# Patient Record
Sex: MALE
[De-identification: names, ages, dates, MRNs, and addresses within clinical notes are randomized per-mention and may not be internally consistent; named-entity substitution may affect disease eponyms.]

---

## 2017-01-31 ENCOUNTER — APPOINTMENT (OUTPATIENT)
Dept: FAMILY MEDICINE | Facility: CLINIC | Age: 27
End: 2017-01-31

## 2017-01-31 ENCOUNTER — NON-APPOINTMENT (OUTPATIENT)
Age: 27
End: 2017-01-31

## 2017-01-31 VITALS
HEART RATE: 92 BPM | DIASTOLIC BLOOD PRESSURE: 100 MMHG | WEIGHT: 315 LBS | SYSTOLIC BLOOD PRESSURE: 150 MMHG | OXYGEN SATURATION: 94 % | BODY MASS INDEX: 45.1 KG/M2 | HEIGHT: 70 IN

## 2017-01-31 DIAGNOSIS — Z13.1 ENCOUNTER FOR SCREENING FOR DIABETES MELLITUS: ICD-10-CM

## 2017-01-31 DIAGNOSIS — J45.909 UNSPECIFIED ASTHMA, UNCOMPLICATED: ICD-10-CM

## 2017-01-31 DIAGNOSIS — Z13.21 ENCOUNTER FOR SCREENING FOR NUTRITIONAL DISORDER: ICD-10-CM

## 2017-01-31 DIAGNOSIS — Z13.220 ENCOUNTER FOR SCREENING FOR LIPOID DISORDERS: ICD-10-CM

## 2017-01-31 DIAGNOSIS — Z13.29 ENCOUNTER FOR SCREENING FOR OTHER SUSPECTED ENDOCRINE DISORDER: ICD-10-CM

## 2017-01-31 DIAGNOSIS — Z11.3 ENCOUNTER FOR SCREENING FOR INFECTIONS WITH A PREDOMINANTLY SEXUAL MODE OF TRANSMISSION: ICD-10-CM

## 2017-01-31 DIAGNOSIS — R53.83 OTHER FATIGUE: ICD-10-CM

## 2017-01-31 DIAGNOSIS — Z87.891 PERSONAL HISTORY OF NICOTINE DEPENDENCE: ICD-10-CM

## 2017-01-31 DIAGNOSIS — M54.2 CERVICALGIA: ICD-10-CM

## 2017-01-31 RX ORDER — BIOTIN 10 MG
TABLET ORAL
Refills: 0 | Status: ACTIVE | COMMUNITY

## 2017-01-31 RX ORDER — ALBUTEROL SULFATE 90 UG/1
108 (90 BASE) AEROSOL, METERED RESPIRATORY (INHALATION)
Qty: 1 | Refills: 1 | Status: ACTIVE | COMMUNITY
Start: 2017-01-31 | End: 1900-01-01

## 2017-02-01 LAB
25(OH)D3 SERPL-MCNC: 10 NG/ML
ALBUMIN SERPL ELPH-MCNC: 4.6 G/DL
ALP BLD-CCNC: 46 U/L
ALT SERPL-CCNC: 36 U/L
ANION GAP SERPL CALC-SCNC: 21 MMOL/L
APPEARANCE: ABNORMAL
AST SERPL-CCNC: 22 U/L
BACTERIA: NEGATIVE
BASOPHILS # BLD AUTO: 0.1 K/UL
BASOPHILS NFR BLD AUTO: 0.9 %
BILIRUB SERPL-MCNC: 0.3 MG/DL
BILIRUBIN URINE: NEGATIVE
BLOOD URINE: NEGATIVE
BUN SERPL-MCNC: 13 MG/DL
C TRACH DNA SPEC QL NAA+PROBE: NORMAL
C TRACH RRNA SPEC QL NAA+PROBE: NORMAL
CALCIUM OXALATE CRYSTALS: ABNORMAL
CALCIUM SERPL-MCNC: 9.6 MG/DL
CHLORIDE SERPL-SCNC: 104 MMOL/L
CHOLEST SERPL-MCNC: 201 MG/DL
CHOLEST/HDLC SERPL: 5.7 RATIO
CO2 SERPL-SCNC: 19 MMOL/L
COLOR: YELLOW
CREAT SERPL-MCNC: 0.81 MG/DL
EOSINOPHIL # BLD AUTO: 0.42 K/UL
EOSINOPHIL NFR BLD AUTO: 3.8 %
GGT SERPL-CCNC: 28 U/L
GLUCOSE QUALITATIVE U: NORMAL MG/DL
GLUCOSE SERPL-MCNC: 90 MG/DL
HBA1C MFR BLD HPLC: 5.5 %
HBV CORE IGG+IGM SER QL: NONREACTIVE
HBV CORE IGM SER QL: NONREACTIVE
HBV SURFACE AB SER QL: REACTIVE
HBV SURFACE AG SER QL: NONREACTIVE
HCT VFR BLD CALC: 47.9 %
HCV AB SER QL: NONREACTIVE
HCV S/CO RATIO: 0.15 S/CO
HDLC SERPL-MCNC: 35 MG/DL
HGB BLD-MCNC: 16.6 G/DL
HIV1+2 AB SPEC QL IA.RAPID: NONREACTIVE
HSV 1+2 IGG SER IA-IMP: NEGATIVE
HSV 1+2 IGG SER IA-IMP: POSITIVE
HSV1 IGG SER QL: 29.1 INDEX
HSV2 IGG SER QL: 0.04 INDEX
HYALINE CASTS: 1 /LPF
IMM GRANULOCYTES NFR BLD AUTO: 0.2 %
KETONES URINE: NEGATIVE
LDLC SERPL CALC-MCNC: 137 MG/DL
LEUKOCYTE ESTERASE URINE: NEGATIVE
LYMPHOCYTES # BLD AUTO: 3.9 K/UL
LYMPHOCYTES NFR BLD AUTO: 34.9 %
MAN DIFF?: NORMAL
MCHC RBC-ENTMCNC: 32 PG
MCHC RBC-ENTMCNC: 34.7 GM/DL
MCV RBC AUTO: 92.3 FL
MICROSCOPIC-UA: NORMAL
MONOCYTES # BLD AUTO: 0.72 K/UL
MONOCYTES NFR BLD AUTO: 6.4 %
N GONORRHOEA DNA SPEC QL NAA+PROBE: NORMAL
N GONORRHOEA RRNA SPEC QL NAA+PROBE: NORMAL
NEUTROPHILS # BLD AUTO: 6.01 K/UL
NEUTROPHILS NFR BLD AUTO: 53.8 %
NITRITE URINE: NEGATIVE
PH URINE: 5.5
PLATELET # BLD AUTO: 316 K/UL
POTASSIUM SERPL-SCNC: 4.2 MMOL/L
PROT SERPL-MCNC: 7.1 G/DL
PROTEIN URINE: NEGATIVE MG/DL
RBC # BLD: 5.19 M/UL
RBC # FLD: 12.1 %
RED BLOOD CELLS URINE: 1 /HPF
SODIUM SERPL-SCNC: 144 MMOL/L
SOURCE AMPLIFICATION: NORMAL
SPECIFIC GRAVITY URINE: 1.03
SQUAMOUS EPITHELIAL CELLS: 1 /HPF
T PALLIDUM AB SER QL IA: NEGATIVE
T3 SERPL-MCNC: 126 NG/DL
T4 SERPL-MCNC: 6.2 UG/DL
TRIGL SERPL-MCNC: 144 MG/DL
TSH SERPL-ACNC: 7.93 UIU/ML
URATE SERPL-MCNC: 6.9 MG/DL
UROBILINOGEN URINE: NORMAL MG/DL
WBC # FLD AUTO: 11.17 K/UL
WHITE BLOOD CELLS URINE: 2 /HPF

## 2017-02-16 ENCOUNTER — APPOINTMENT (OUTPATIENT)
Dept: FAMILY MEDICINE | Facility: CLINIC | Age: 27
End: 2017-02-16

## 2017-02-16 VITALS
DIASTOLIC BLOOD PRESSURE: 88 MMHG | BODY MASS INDEX: 44.67 KG/M2 | WEIGHT: 312 LBS | HEIGHT: 70 IN | HEART RATE: 91 BPM | SYSTOLIC BLOOD PRESSURE: 154 MMHG

## 2017-02-16 VITALS — SYSTOLIC BLOOD PRESSURE: 136 MMHG | DIASTOLIC BLOOD PRESSURE: 88 MMHG

## 2017-02-16 DIAGNOSIS — I10 ESSENTIAL (PRIMARY) HYPERTENSION: ICD-10-CM

## 2017-02-16 DIAGNOSIS — L64.9 ANDROGENIC ALOPECIA, UNSPECIFIED: ICD-10-CM

## 2017-02-16 RX ORDER — VITAMIN K2 90 MCG
125 MCG CAPSULE ORAL
Qty: 90 | Refills: 3 | Status: ACTIVE | COMMUNITY
Start: 2017-02-16 | End: 1900-01-01

## 2017-02-23 ENCOUNTER — MEDICATION RENEWAL (OUTPATIENT)
Age: 27
End: 2017-02-23

## 2017-03-02 ENCOUNTER — APPOINTMENT (OUTPATIENT)
Dept: FAMILY MEDICINE | Facility: CLINIC | Age: 27
End: 2017-03-02

## 2017-03-02 VITALS
HEART RATE: 92 BPM | WEIGHT: 315 LBS | BODY MASS INDEX: 45.1 KG/M2 | SYSTOLIC BLOOD PRESSURE: 134 MMHG | HEIGHT: 70 IN | DIASTOLIC BLOOD PRESSURE: 88 MMHG

## 2017-03-02 DIAGNOSIS — Z00.00 ENCOUNTER FOR GENERAL ADULT MEDICAL EXAMINATION W/OUT ABNORMAL FINDINGS: ICD-10-CM

## 2017-03-02 DIAGNOSIS — R94.6 ABNORMAL RESULTS OF THYROID FUNCTION STUDIES: ICD-10-CM

## 2017-03-03 LAB
BASOPHILS # BLD AUTO: 0.13 K/UL
BASOPHILS NFR BLD AUTO: 1.2 %
EOSINOPHIL # BLD AUTO: 0.49 K/UL
EOSINOPHIL NFR BLD AUTO: 4.5 %
HCT VFR BLD CALC: 48 %
HGB BLD-MCNC: 16.3 G/DL
IMM GRANULOCYTES NFR BLD AUTO: 0.4 %
LYMPHOCYTES # BLD AUTO: 3.73 K/UL
LYMPHOCYTES NFR BLD AUTO: 34.2 %
MAN DIFF?: NORMAL
MCHC RBC-ENTMCNC: 31.5 PG
MCHC RBC-ENTMCNC: 34 GM/DL
MCV RBC AUTO: 92.7 FL
MONOCYTES # BLD AUTO: 0.97 K/UL
MONOCYTES NFR BLD AUTO: 8.9 %
NEUTROPHILS # BLD AUTO: 5.56 K/UL
NEUTROPHILS NFR BLD AUTO: 50.8 %
PLATELET # BLD AUTO: 320 K/UL
RBC # BLD: 5.18 M/UL
RBC # FLD: 12.5 %
T3 SERPL-MCNC: 185 NG/DL
T4 SERPL-MCNC: 6.4 UG/DL
TSH SERPL-ACNC: 5.33 UIU/ML
WBC # FLD AUTO: 10.92 K/UL

## 2017-03-24 ENCOUNTER — MEDICATION RENEWAL (OUTPATIENT)
Age: 27
End: 2017-03-24

## 2017-04-03 ENCOUNTER — APPOINTMENT (OUTPATIENT)
Dept: FAMILY MEDICINE | Facility: CLINIC | Age: 27
End: 2017-04-03

## 2017-06-16 ENCOUNTER — APPOINTMENT (OUTPATIENT)
Dept: FAMILY MEDICINE | Facility: CLINIC | Age: 27
End: 2017-06-16

## 2017-06-16 VITALS
OXYGEN SATURATION: 98 % | BODY MASS INDEX: 45.1 KG/M2 | SYSTOLIC BLOOD PRESSURE: 134 MMHG | HEART RATE: 96 BPM | DIASTOLIC BLOOD PRESSURE: 88 MMHG | WEIGHT: 315 LBS | HEIGHT: 70 IN

## 2017-06-16 DIAGNOSIS — D72.820 LYMPHOCYTOSIS (SYMPTOMATIC): ICD-10-CM

## 2017-06-16 RX ORDER — BUDESONIDE AND FORMOTEROL FUMARATE DIHYDRATE 160; 4.5 UG/1; UG/1
160-4.5 AEROSOL RESPIRATORY (INHALATION) TWICE DAILY
Qty: 1 | Refills: 3 | Status: COMPLETED | COMMUNITY
Start: 2017-02-23 | End: 2017-06-16

## 2017-06-16 RX ORDER — FLUTICASONE PROPIONATE AND SALMETEROL 50; 250 UG/1; UG/1
250-50 POWDER RESPIRATORY (INHALATION)
Qty: 3 | Refills: 3 | Status: COMPLETED | COMMUNITY
End: 2017-06-16

## 2017-06-19 LAB
25(OH)D3 SERPL-MCNC: 26.8 NG/ML
ALBUMIN SERPL ELPH-MCNC: 5 G/DL
ALP BLD-CCNC: 42 U/L
ALT SERPL-CCNC: 44 U/L
ANION GAP SERPL CALC-SCNC: 16 MMOL/L
AST SERPL-CCNC: 22 U/L
BASOPHILS # BLD AUTO: 0.09 K/UL
BASOPHILS NFR BLD AUTO: 0.9 %
BILIRUB SERPL-MCNC: 0.3 MG/DL
BUN SERPL-MCNC: 9 MG/DL
CALCIUM SERPL-MCNC: 10.3 MG/DL
CHLORIDE SERPL-SCNC: 104 MMOL/L
CO2 SERPL-SCNC: 21 MMOL/L
CREAT SERPL-MCNC: 0.93 MG/DL
EOSINOPHIL # BLD AUTO: 0.31 K/UL
EOSINOPHIL NFR BLD AUTO: 3 %
GLUCOSE SERPL-MCNC: 125 MG/DL
HCT VFR BLD CALC: 48.6 %
HGB BLD-MCNC: 16.4 G/DL
IMM GRANULOCYTES NFR BLD AUTO: 0.2 %
LYMPHOCYTES # BLD AUTO: 3.41 K/UL
LYMPHOCYTES NFR BLD AUTO: 32.5 %
MAN DIFF?: NORMAL
MCHC RBC-ENTMCNC: 30.8 PG
MCHC RBC-ENTMCNC: 33.7 GM/DL
MCV RBC AUTO: 91.2 FL
MONOCYTES # BLD AUTO: 0.66 K/UL
MONOCYTES NFR BLD AUTO: 6.3 %
NEUTROPHILS # BLD AUTO: 5.99 K/UL
NEUTROPHILS NFR BLD AUTO: 57.1 %
PLATELET # BLD AUTO: 322 K/UL
POTASSIUM SERPL-SCNC: 4.1 MMOL/L
PROT SERPL-MCNC: 7.4 G/DL
RBC # BLD: 5.33 M/UL
RBC # FLD: 12.1 %
SODIUM SERPL-SCNC: 141 MMOL/L
T3 SERPL-MCNC: 123 NG/DL
T4 SERPL-MCNC: 6.7 UG/DL
TSH SERPL-ACNC: 4.12 UIU/ML
WBC # FLD AUTO: 10.48 K/UL

## 2017-07-26 ENCOUNTER — MEDICATION RENEWAL (OUTPATIENT)
Age: 27
End: 2017-07-26

## 2017-08-15 ENCOUNTER — APPOINTMENT (OUTPATIENT)
Dept: FAMILY MEDICINE | Facility: CLINIC | Age: 27
End: 2017-08-15
Payer: MEDICAID

## 2017-08-15 VITALS
WEIGHT: 315 LBS | BODY MASS INDEX: 45.1 KG/M2 | HEART RATE: 85 BPM | DIASTOLIC BLOOD PRESSURE: 84 MMHG | HEIGHT: 70 IN | SYSTOLIC BLOOD PRESSURE: 128 MMHG | OXYGEN SATURATION: 96 %

## 2017-08-15 DIAGNOSIS — E78.5 HYPERLIPIDEMIA, UNSPECIFIED: ICD-10-CM

## 2017-08-15 DIAGNOSIS — F41.9 ANXIETY DISORDER, UNSPECIFIED: ICD-10-CM

## 2017-08-15 DIAGNOSIS — E55.9 VITAMIN D DEFICIENCY, UNSPECIFIED: ICD-10-CM

## 2017-08-15 DIAGNOSIS — E53.8 DEFICIENCY OF OTHER SPECIFIED B GROUP VITAMINS: ICD-10-CM

## 2017-08-15 DIAGNOSIS — E03.9 HYPOTHYROIDISM, UNSPECIFIED: ICD-10-CM

## 2017-08-15 PROCEDURE — 99214 OFFICE O/P EST MOD 30 MIN: CPT | Mod: 25

## 2017-08-15 PROCEDURE — 36415 COLL VENOUS BLD VENIPUNCTURE: CPT

## 2017-08-15 RX ORDER — LEVOTHYROXINE SODIUM 0.05 MG/1
50 TABLET ORAL
Qty: 60 | Refills: 0 | Status: COMPLETED | COMMUNITY
Start: 2017-03-24 | End: 2017-08-15

## 2017-08-16 LAB
25(OH)D3 SERPL-MCNC: 102 NG/ML
CHOLEST SERPL-MCNC: 208 MG/DL
CHOLEST/HDLC SERPL: 5.8 RATIO
HDLC SERPL-MCNC: 36 MG/DL
LDLC SERPL CALC-MCNC: 142 MG/DL
T3 SERPL-MCNC: 118 NG/DL
T4 SERPL-MCNC: 6.7 UG/DL
TRIGL SERPL-MCNC: 148 MG/DL
TSH SERPL-ACNC: 2.47 UIU/ML
VIT B12 SERPL-MCNC: 371 PG/ML

## 2018-02-01 ENCOUNTER — OUTPATIENT (OUTPATIENT)
Dept: OUTPATIENT SERVICES | Facility: HOSPITAL | Age: 28
LOS: 1 days | End: 2018-02-01
Payer: MEDICAID

## 2018-02-01 DIAGNOSIS — R69 ILLNESS, UNSPECIFIED: ICD-10-CM

## 2018-02-01 PROCEDURE — G9001: CPT

## 2018-03-20 DIAGNOSIS — Z60.2 PROBLEMS RELATED TO LIVING ALONE: ICD-10-CM

## 2018-03-20 SDOH — SOCIAL STABILITY - SOCIAL INSECURITY: PROBLEMS RELATED TO LIVING ALONE: Z60.2

## 2018-04-09 ENCOUNTER — RX RENEWAL (OUTPATIENT)
Age: 28
End: 2018-04-09

## 2018-04-09 RX ORDER — BIOTIN 10 MG
10000 TABLET ORAL DAILY
Qty: 90 | Refills: 2 | Status: ACTIVE | COMMUNITY
Start: 2017-02-16 | End: 1900-01-01

## 2018-05-07 ENCOUNTER — RX RENEWAL (OUTPATIENT)
Age: 28
End: 2018-05-07

## 2018-05-07 RX ORDER — LEVOTHYROXINE SODIUM 0.05 MG/1
50 TABLET ORAL DAILY
Qty: 90 | Refills: 0 | Status: ACTIVE | COMMUNITY
Start: 2017-03-03 | End: 1900-01-01

## 2018-06-14 ENCOUNTER — RX RENEWAL (OUTPATIENT)
Age: 28
End: 2018-06-14

## 2018-06-14 RX ORDER — HYDROXYZINE HYDROCHLORIDE 25 MG/1
25 TABLET ORAL
Qty: 30 | Refills: 2 | Status: ACTIVE | COMMUNITY
Start: 2017-01-31 | End: 1900-01-01

## 2018-07-26 PROBLEM — Z13.1 SCREENING FOR DIABETES MELLITUS: Status: ACTIVE | Noted: 2017-01-31

## 2020-06-22 ENCOUNTER — TRANSCRIPTION ENCOUNTER (OUTPATIENT)
Age: 30
End: 2020-06-22

## 2023-11-29 ENCOUNTER — NON-APPOINTMENT (OUTPATIENT)
Age: 33
End: 2023-11-29

## 2024-08-02 ENCOUNTER — NON-APPOINTMENT (OUTPATIENT)
Age: 34
End: 2024-08-02

## 2024-08-02 ENCOUNTER — APPOINTMENT (OUTPATIENT)
Dept: FAMILY MEDICINE | Facility: CLINIC | Age: 34
End: 2024-08-02
Payer: MEDICAID

## 2024-08-02 VITALS
DIASTOLIC BLOOD PRESSURE: 90 MMHG | OXYGEN SATURATION: 96 % | WEIGHT: 315 LBS | BODY MASS INDEX: 45.1 KG/M2 | SYSTOLIC BLOOD PRESSURE: 132 MMHG | HEIGHT: 70 IN | HEART RATE: 76 BPM

## 2024-08-02 VITALS — SYSTOLIC BLOOD PRESSURE: 148 MMHG | DIASTOLIC BLOOD PRESSURE: 100 MMHG

## 2024-08-02 DIAGNOSIS — J45.909 UNSPECIFIED ASTHMA, UNCOMPLICATED: ICD-10-CM

## 2024-08-02 DIAGNOSIS — E03.9 HYPOTHYROIDISM, UNSPECIFIED: ICD-10-CM

## 2024-08-02 DIAGNOSIS — I10 ESSENTIAL (PRIMARY) HYPERTENSION: ICD-10-CM

## 2024-08-02 DIAGNOSIS — Z11.3 ENCOUNTER FOR SCREENING FOR INFECTIONS WITH A PREDOMINANTLY SEXUAL MODE OF TRANSMISSION: ICD-10-CM

## 2024-08-02 DIAGNOSIS — Z13.1 ENCOUNTER FOR SCREENING FOR DIABETES MELLITUS: ICD-10-CM

## 2024-08-02 DIAGNOSIS — L65.9 NONSCARRING HAIR LOSS, UNSPECIFIED: ICD-10-CM

## 2024-08-02 DIAGNOSIS — Z00.00 ENCOUNTER FOR GENERAL ADULT MEDICAL EXAMINATION W/OUT ABNORMAL FINDINGS: ICD-10-CM

## 2024-08-02 DIAGNOSIS — R30.0 DYSURIA: ICD-10-CM

## 2024-08-02 DIAGNOSIS — E78.5 HYPERLIPIDEMIA, UNSPECIFIED: ICD-10-CM

## 2024-08-02 PROCEDURE — 93000 ELECTROCARDIOGRAM COMPLETE: CPT

## 2024-08-02 PROCEDURE — 99385 PREV VISIT NEW AGE 18-39: CPT | Mod: 25

## 2024-08-02 PROCEDURE — 99203 OFFICE O/P NEW LOW 30 MIN: CPT | Mod: 25

## 2024-08-02 NOTE — PLAN
[FreeTextEntry1] : EKG performed: SR at 71 bpm, normal axis, no ST/T changes   see lab orders  see radiology orders  cont Albuterol MDI   < 1500mg of Na+/diet    daily aerobic exercise   30mins  per day advised    1M for BP check

## 2024-08-02 NOTE — HISTORY OF PRESENT ILLNESS
[FreeTextEntry1] : NP-CPE [de-identified] : NP CPE    as above +ve FAST no CP/SOB c activity no dizziness no  palpitations no N/V/D +BM qd NL no bloody/black stools  no acute change in bowel  habits +ve intermittent "sting"when urinating,  +ve decreased strength of urination, hx of larry hemturia,  Hx of blood clot in ejaculate x 1 (2-3M ago)    Denies associated  fever/chills  STABLE sweats.

## 2024-08-02 NOTE — PHYSICAL EXAM
[No Acute Distress] : no acute distress [Well Nourished] : well nourished [Well Developed] : well developed [Well-Appearing] : well-appearing [EOMI] : extraocular movements intact [Normal Outer Ear/Nose] : the outer ears and nose were normal in appearance [No JVD] : no jugular venous distention [No Respiratory Distress] : no respiratory distress  [No Accessory Muscle Use] : no accessory muscle use [Clear to Auscultation] : lungs were clear to auscultation bilaterally [Normal Rate] : normal rate  [Regular Rhythm] : with a regular rhythm [Normal S1, S2] : normal S1 and S2 [No Carotid Bruits] : no carotid bruits [No Edema] : there was no peripheral edema [No Palpable Aorta] : no palpable aorta [No Extremity Clubbing/Cyanosis] : no extremity clubbing/cyanosis [No Axillary Lymphadenopathy] : no axillary lymphadenopathy [Soft] : abdomen soft [Non Tender] : non-tender [Non-distended] : non-distended [No Masses] : no abdominal mass palpated [No HSM] : no HSM [Normal Bowel Sounds] : normal bowel sounds [No CVA Tenderness] : no CVA  tenderness [Grossly Normal Strength/Tone] : grossly normal strength/tone [No Rash] : no rash [Coordination Grossly Intact] : coordination grossly intact [No Focal Deficits] : no focal deficits [Normal Gait] : normal gait [Normal Affect] : the affect was normal [Normal Mood] : the mood was normal [Normal Insight/Judgement] : insight and judgment were intact

## 2024-08-05 LAB
ALBUMIN SERPL ELPH-MCNC: 4.6 G/DL
ALP BLD-CCNC: 39 U/L
ALT SERPL-CCNC: 19 U/L
ANION GAP SERPL CALC-SCNC: 14 MMOL/L
APPEARANCE: CLEAR
AST SERPL-CCNC: 17 U/L
BACTERIA UR CULT: NORMAL
BACTERIA: ABNORMAL /HPF
BASOPHILS # BLD AUTO: 0.12 K/UL
BASOPHILS NFR BLD AUTO: 1.3 %
BILIRUB SERPL-MCNC: 0.2 MG/DL
BILIRUBIN URINE: NEGATIVE
BLOOD URINE: NEGATIVE
BUN SERPL-MCNC: 13 MG/DL
C TRACH RRNA SPEC QL NAA+PROBE: NOT DETECTED
CALCIUM OXALATE CRYSTALS: PRESENT
CALCIUM SERPL-MCNC: 9.4 MG/DL
CAST: NORMAL /LPF
CHLORIDE SERPL-SCNC: 104 MMOL/L
CHOLEST SERPL-MCNC: 188 MG/DL
CO2 SERPL-SCNC: 21 MMOL/L
COLOR: YELLOW
CREAT SERPL-MCNC: 0.69 MG/DL
EGFR: 125 ML/MIN/1.73M2
EOSINOPHIL # BLD AUTO: 0.38 K/UL
EOSINOPHIL NFR BLD AUTO: 4.1 %
EPITHELIAL CELLS: 4 /HPF
ESTIMATED AVERAGE GLUCOSE: 108 MG/DL
FERRITIN SERPL-MCNC: 259 NG/ML
FOLATE SERPL-MCNC: 8.9 NG/ML
GLUCOSE QUALITATIVE U: NEGATIVE MG/DL
GLUCOSE SERPL-MCNC: 104 MG/DL
HBA1C MFR BLD HPLC: 5.4 %
HBV CORE IGG+IGM SER QL: NONREACTIVE
HBV CORE IGM SER QL: NONREACTIVE
HBV SURFACE AB SER QL: REACTIVE
HBV SURFACE AG SER QL: NONREACTIVE
HCT VFR BLD CALC: 44.5 %
HCV AB SER QL: NONREACTIVE
HCV S/CO RATIO: 0.08 S/CO
HDLC SERPL-MCNC: 35 MG/DL
HGB BLD-MCNC: 14.5 G/DL
HIV1+2 AB SPEC QL IA.RAPID: NONREACTIVE
HSV 1+2 IGG SER IA-IMP: NEGATIVE
HSV 1+2 IGG SER IA-IMP: POSITIVE
HSV1 IGG SER QL: 22.6 INDEX
HSV2 IGG SER QL: 0.03 INDEX
IMM GRANULOCYTES NFR BLD AUTO: 0.2 %
IRON SATN MFR SERPL: 22 %
IRON SERPL-MCNC: 59 UG/DL
KETONES URINE: NEGATIVE MG/DL
LDLC SERPL CALC-MCNC: 133 MG/DL
LEUKOCYTE ESTERASE URINE: ABNORMAL
LYMPHOCYTES # BLD AUTO: 3.12 K/UL
LYMPHOCYTES NFR BLD AUTO: 33.8 %
MAN DIFF?: NORMAL
MCHC RBC-ENTMCNC: 31.8 PG
MCHC RBC-ENTMCNC: 32.6 GM/DL
MCV RBC AUTO: 97.6 FL
MICROSCOPIC-UA: NORMAL
MONOCYTES # BLD AUTO: 0.65 K/UL
MONOCYTES NFR BLD AUTO: 7 %
MUCUS: PRESENT
N GONORRHOEA RRNA SPEC QL NAA+PROBE: NOT DETECTED
NEUTROPHILS # BLD AUTO: 4.93 K/UL
NEUTROPHILS NFR BLD AUTO: 53.6 %
NITRITE URINE: NEGATIVE
NONHDLC SERPL-MCNC: 153 MG/DL
PH URINE: 6
PLATELET # BLD AUTO: 334 K/UL
POTASSIUM SERPL-SCNC: 4.1 MMOL/L
PROT SERPL-MCNC: 6.6 G/DL
PROTEIN URINE: NORMAL MG/DL
PSA FREE FLD-MCNC: 45 %
PSA FREE SERPL-MCNC: 0.07 NG/ML
PSA SERPL-MCNC: 0.16 NG/ML
RBC # BLD: 4.56 M/UL
RBC # FLD: 12.2 %
RED BLOOD CELLS URINE: NORMAL /HPF
REVIEW: NORMAL
SODIUM SERPL-SCNC: 139 MMOL/L
SOURCE AMPLIFICATION: NORMAL
SPECIFIC GRAVITY URINE: >1.03
T PALLIDUM AB SER QL IA: NEGATIVE
T3FREE SERPL-MCNC: 3.82 PG/ML
T4 FREE SERPL-MCNC: 1.2 NG/DL
TIBC SERPL-MCNC: 262 UG/DL
TRANSFERRIN SERPL-MCNC: 228 MG/DL
TRIGL SERPL-MCNC: 111 MG/DL
TSH SERPL-ACNC: 4.28 UIU/ML
UIBC SERPL-MCNC: 204 UG/DL
URATE SERPL-MCNC: 6.3 MG/DL
UROBILINOGEN URINE: 1 MG/DL
VIT B12 SERPL-MCNC: 258 PG/ML
WBC # FLD AUTO: 9.22 K/UL
WHITE BLOOD CELLS URINE: 7 /HPF

## 2024-08-08 PROBLEM — R79.89 LOW TESTOSTERONE: Status: ACTIVE | Noted: 2024-08-08

## 2024-08-08 LAB
TESTOST FREE SERPL-MCNC: 7.8 PG/ML
TESTOST SERPL-MCNC: 165 NG/DL
URINE CYTOLOGY: NORMAL

## 2024-09-03 ENCOUNTER — APPOINTMENT (OUTPATIENT)
Dept: FAMILY MEDICINE | Facility: CLINIC | Age: 34
End: 2024-09-03
Payer: MEDICAID

## 2024-09-03 VITALS
BODY MASS INDEX: 45.1 KG/M2 | HEART RATE: 90 BPM | HEIGHT: 70 IN | DIASTOLIC BLOOD PRESSURE: 82 MMHG | TEMPERATURE: 98 F | WEIGHT: 315 LBS | OXYGEN SATURATION: 96 % | SYSTOLIC BLOOD PRESSURE: 130 MMHG

## 2024-09-03 DIAGNOSIS — E53.8 DEFICIENCY OF OTHER SPECIFIED B GROUP VITAMINS: ICD-10-CM

## 2024-09-03 DIAGNOSIS — K42.9 UMBILICAL HERNIA W/OUT OBSTRUCTION OR GANGRENE: ICD-10-CM

## 2024-09-03 DIAGNOSIS — R82.998 OTHER ABNORMAL FINDINGS IN URINE: ICD-10-CM

## 2024-09-03 DIAGNOSIS — Z11.3 ENCOUNTER FOR SCREENING FOR INFECTIONS WITH A PREDOMINANTLY SEXUAL MODE OF TRANSMISSION: ICD-10-CM

## 2024-09-03 DIAGNOSIS — R79.89 OTHER SPECIFIED ABNORMAL FINDINGS OF BLOOD CHEMISTRY: ICD-10-CM

## 2024-09-03 PROCEDURE — 99214 OFFICE O/P EST MOD 30 MIN: CPT | Mod: 25

## 2024-09-03 RX ORDER — MAGNESIUM 200 MG
1000 TABLET ORAL DAILY
Qty: 30 | Refills: 3 | Status: ACTIVE | COMMUNITY
Start: 2024-09-03 | End: 1900-01-01

## 2024-09-03 RX ORDER — OMEGA-3/DHA/EPA/FISH OIL 1200 MG
1200 CAPSULE ORAL DAILY
Qty: 60 | Refills: 5 | Status: ACTIVE | COMMUNITY
Start: 2024-09-03 | End: 1900-01-01

## 2024-09-03 NOTE — PHYSICAL EXAM
[No Acute Distress] : no acute distress [Well Nourished] : well nourished [Well Developed] : well developed [Well-Appearing] : well-appearing [EOMI] : extraocular movements intact [Normal Outer Ear/Nose] : the outer ears and nose were normal in appearance [No JVD] : no jugular venous distention [No Respiratory Distress] : no respiratory distress  [No Accessory Muscle Use] : no accessory muscle use [Clear to Auscultation] : lungs were clear to auscultation bilaterally [Normal Rate] : normal rate  [Regular Rhythm] : with a regular rhythm [Normal S1, S2] : normal S1 and S2 [No Carotid Bruits] : no carotid bruits [No Edema] : there was no peripheral edema [No Palpable Aorta] : no palpable aorta [No Extremity Clubbing/Cyanosis] : no extremity clubbing/cyanosis [No Axillary Lymphadenopathy] : no axillary lymphadenopathy [Soft] : abdomen soft [Non Tender] : non-tender [No Masses] : no abdominal mass palpated [No HSM] : no HSM [Normal Bowel Sounds] : normal bowel sounds [No CVA Tenderness] : no CVA  tenderness [Grossly Normal Strength/Tone] : grossly normal strength/tone [No Rash] : no rash [Coordination Grossly Intact] : coordination grossly intact [No Focal Deficits] : no focal deficits [Normal Gait] : normal gait [Normal Affect] : the affect was normal [Normal Mood] : the mood was normal [Normal Insight/Judgement] : insight and judgment were intact [de-identified] : minimally tender umbilical hernia

## 2024-09-03 NOTE — HISTORY OF PRESENT ILLNESS
[FreeTextEntry1] : follow up [de-identified] : 34 yo male for f/u on elevated TSH, low Testosterone, abnormal U/A and B12 def.   pt request STI w/u and reports ?? hernia of abdomen   states swelling is minimally intermittently tender

## 2024-09-03 NOTE — PLAN
[FreeTextEntry1] : see lab orders  see med orders   Urology consult scheduled tomorrow 9/4/24   see referral   f/u pending lab results

## 2024-09-04 ENCOUNTER — LABORATORY RESULT (OUTPATIENT)
Age: 34
End: 2024-09-04

## 2024-09-04 ENCOUNTER — APPOINTMENT (OUTPATIENT)
Dept: UROLOGY | Facility: CLINIC | Age: 34
End: 2024-09-04
Payer: MEDICAID

## 2024-09-04 VITALS
SYSTOLIC BLOOD PRESSURE: 143 MMHG | BODY MASS INDEX: 45.1 KG/M2 | WEIGHT: 315 LBS | HEIGHT: 70 IN | DIASTOLIC BLOOD PRESSURE: 100 MMHG

## 2024-09-04 DIAGNOSIS — R39.16 STRAINING TO VOID: ICD-10-CM

## 2024-09-04 DIAGNOSIS — Z72.0 TOBACCO USE: ICD-10-CM

## 2024-09-04 DIAGNOSIS — R30.0 DYSURIA: ICD-10-CM

## 2024-09-04 DIAGNOSIS — N52.9 MALE ERECTILE DYSFUNCTION, UNSPECIFIED: ICD-10-CM

## 2024-09-04 DIAGNOSIS — R79.89 OTHER SPECIFIED ABNORMAL FINDINGS OF BLOOD CHEMISTRY: ICD-10-CM

## 2024-09-04 DIAGNOSIS — R53.83 OTHER FATIGUE: ICD-10-CM

## 2024-09-04 DIAGNOSIS — R39.12 POOR URINARY STREAM: ICD-10-CM

## 2024-09-04 PROCEDURE — 99204 OFFICE O/P NEW MOD 45 MIN: CPT

## 2024-09-04 PROCEDURE — 99214 OFFICE O/P EST MOD 30 MIN: CPT

## 2024-09-04 NOTE — REVIEW OF SYSTEMS
[see HPI] : see HPI [Negative] : Heme/Lymph [Constipation] : constipation [Hesitancy] : urinary hesitancy

## 2024-09-05 LAB
APPEARANCE: CLEAR
APPEARANCE: CLEAR
BACTERIA: ABNORMAL /HPF
BILIRUBIN URINE: NEGATIVE
BILIRUBIN URINE: NEGATIVE
BLOOD URINE: NEGATIVE
BLOOD URINE: NEGATIVE
C TRACH RRNA SPEC QL NAA+PROBE: NOT DETECTED
CAST: 2 /LPF
COLOR: YELLOW
COLOR: YELLOW
EPITHELIAL CELLS: 9 /HPF
GLUCOSE QUALITATIVE U: NEGATIVE MG/DL
GLUCOSE QUALITATIVE U: NEGATIVE MG/DL
HIV1+2 AB SPEC QL IA.RAPID: NONREACTIVE
HSV 1+2 IGG SER IA-IMP: NEGATIVE
HSV 1+2 IGG SER IA-IMP: POSITIVE
HSV1 IGG SER QL: 25.1 INDEX
HSV2 IGG SER QL: 0.05 INDEX
KETONES URINE: NEGATIVE MG/DL
KETONES URINE: NEGATIVE MG/DL
LEUKOCYTE ESTERASE URINE: ABNORMAL
LEUKOCYTE ESTERASE URINE: ABNORMAL
MICROSCOPIC-UA: NORMAL
N GONORRHOEA RRNA SPEC QL NAA+PROBE: NOT DETECTED
NITRITE URINE: NEGATIVE
NITRITE URINE: NEGATIVE
PH URINE: 6
PH URINE: 6
PROTEIN URINE: NEGATIVE MG/DL
PROTEIN URINE: NORMAL MG/DL
RED BLOOD CELLS URINE: 1 /HPF
REVIEW: NORMAL
SOURCE AMPLIFICATION: NORMAL
SPECIFIC GRAVITY URINE: 1.02
SPECIFIC GRAVITY URINE: 1.03
T PALLIDUM AB SER QL IA: NEGATIVE
UROBILINOGEN URINE: 0.2 MG/DL
UROBILINOGEN URINE: 1 MG/DL
WHITE BLOOD CELLS URINE: 2 /HPF

## 2024-09-06 LAB
HBV CORE IGG+IGM SER QL: NONREACTIVE
HBV CORE IGM SER QL: NONREACTIVE
HBV SURFACE AB SER QL: REACTIVE
HBV SURFACE AG SER QL: NONREACTIVE
HCV AB SER QL: NONREACTIVE
HCV S/CO RATIO: 0.07 S/CO

## 2024-09-08 LAB — PROLACTIN SERPL-MCNC: 15.9 NG/ML

## 2024-09-10 ENCOUNTER — APPOINTMENT (OUTPATIENT)
Dept: UROLOGY | Facility: CLINIC | Age: 34
End: 2024-09-10
Payer: MEDICAID

## 2024-09-10 DIAGNOSIS — N35.919 UNSPECIFIED URETHRAL STRICTURE, MALE, UNSPECIFIED SITE: ICD-10-CM

## 2024-09-10 LAB
BILIRUB UR QL STRIP: NORMAL
CLARITY UR: CLEAR
COLLECTION METHOD: NORMAL
GLUCOSE UR-MCNC: NORMAL
HCG UR QL: 1 EU/DL
HGB UR QL STRIP.AUTO: NORMAL
KETONES UR-MCNC: NORMAL
LEUKOCYTE ESTERASE UR QL STRIP: NORMAL
NITRITE UR QL STRIP: NORMAL
PH UR STRIP: 7
PROT UR STRIP-MCNC: NORMAL
SP GR UR STRIP: 1.02

## 2024-09-10 PROCEDURE — 52000 CYSTOURETHROSCOPY: CPT

## 2024-09-10 PROCEDURE — 81003 URINALYSIS AUTO W/O SCOPE: CPT | Mod: QW

## 2024-09-11 LAB
FSH SERPL-MCNC: 2.7 IU/L
LH SERPL-ACNC: 6 IU/L
T3FREE SERPL-MCNC: 3.2 PG/ML
T4 FREE SERPL-MCNC: 1.2 NG/DL
TESTOST FREE SERPL-MCNC: 7.2 PG/ML
TESTOST SERPL-MCNC: 167 NG/DL
TSH SERPL-ACNC: 2.08 UIU/ML
VIT B12 SERPL-MCNC: 284 PG/ML

## 2024-09-11 NOTE — PHYSICAL EXAM
[Normal Appearance] : normal appearance [Well Groomed] : well groomed [General Appearance - In No Acute Distress] : no acute distress [Respiration, Rhythm And Depth] : normal respiratory rhythm and effort [Exaggerated Use Of Accessory Muscles For Inspiration] : no accessory muscle use [Abdomen Soft] : soft [Abdomen Tenderness] : non-tender [Urethral Meatus] : meatus normal [Penis Abnormality] : normal uncircumcised penis [Urinary Bladder Findings] : the bladder was normal on palpation [Scrotum] : the scrotum was normal [Epididymis] : the epididymides were normal [Testes Tenderness] : no tenderness of the testes [Testes Mass (___cm)] : there were no testicular masses [Anus Abnormality] : the anus and perineum were normal [Rectal Exam - Rectum] : digital rectal exam was normal [Prostate Enlargement] : the prostate was not enlarged [Prostate Tenderness] : the prostate was not tender [No Prostate Nodules] : no prostate nodules [Normal Station and Gait] : the gait and station were normal for the patient's age [] : no rash [Skin Color & Pigmentation] : normal skin color and pigmentation [No Focal Deficits] : no focal deficits [Oriented To Time, Place, And Person] : oriented to person, place, and time [Affect] : the affect was normal [Mood] : the mood was normal [General Appearance - Well Developed] : well developed [General Appearance - Well Nourished] : well nourished [de-identified] : morbidly obese [de-identified] : tethered skin to anterior corona

## 2024-09-11 NOTE — PHYSICAL EXAM
[Normal Appearance] : normal appearance [Well Groomed] : well groomed [General Appearance - In No Acute Distress] : no acute distress [Respiration, Rhythm And Depth] : normal respiratory rhythm and effort [Exaggerated Use Of Accessory Muscles For Inspiration] : no accessory muscle use [Abdomen Soft] : soft [Abdomen Tenderness] : non-tender [Urethral Meatus] : meatus normal [Urinary Bladder Findings] : the bladder was normal on palpation [Penis Abnormality] : normal uncircumcised penis [Scrotum] : the scrotum was normal [Epididymis] : the epididymides were normal [Testes Tenderness] : no tenderness of the testes [Testes Mass (___cm)] : there were no testicular masses [Anus Abnormality] : the anus and perineum were normal [Rectal Exam - Rectum] : digital rectal exam was normal [Prostate Enlargement] : the prostate was not enlarged [Prostate Tenderness] : the prostate was not tender [No Prostate Nodules] : no prostate nodules [Normal Station and Gait] : the gait and station were normal for the patient's age [] : no rash [Skin Color & Pigmentation] : normal skin color and pigmentation [No Focal Deficits] : no focal deficits [Oriented To Time, Place, And Person] : oriented to person, place, and time [Affect] : the affect was normal [Mood] : the mood was normal [General Appearance - Well Developed] : well developed [General Appearance - Well Nourished] : well nourished [de-identified] : morbidly obese [de-identified] : tethered skin to anterior corona

## 2024-09-11 NOTE — PHYSICAL EXAM
[Normal Appearance] : normal appearance [Well Groomed] : well groomed [General Appearance - In No Acute Distress] : no acute distress [Respiration, Rhythm And Depth] : normal respiratory rhythm and effort [Exaggerated Use Of Accessory Muscles For Inspiration] : no accessory muscle use [Abdomen Soft] : soft [Abdomen Tenderness] : non-tender [Urethral Meatus] : meatus normal [Penis Abnormality] : normal uncircumcised penis [Urinary Bladder Findings] : the bladder was normal on palpation [Scrotum] : the scrotum was normal [Epididymis] : the epididymides were normal [Testes Tenderness] : no tenderness of the testes [Testes Mass (___cm)] : there were no testicular masses [Anus Abnormality] : the anus and perineum were normal [Rectal Exam - Rectum] : digital rectal exam was normal [Prostate Enlargement] : the prostate was not enlarged [Prostate Tenderness] : the prostate was not tender [No Prostate Nodules] : no prostate nodules [Normal Station and Gait] : the gait and station were normal for the patient's age [] : no rash [Skin Color & Pigmentation] : normal skin color and pigmentation [No Focal Deficits] : no focal deficits [Oriented To Time, Place, And Person] : oriented to person, place, and time [Affect] : the affect was normal [Mood] : the mood was normal [General Appearance - Well Developed] : well developed [General Appearance - Well Nourished] : well nourished [de-identified] : morbidly obese [de-identified] : tethered skin to anterior corona

## 2024-09-11 NOTE — HISTORY OF PRESENT ILLNESS
[FreeTextEntry1] : With c/o intermittent weak stream, hesitant stream, slow ejaculation, sometimes straining to void, sprayed stream, burning sensation at beginning of stream and sometimes throughout his stream since 2021.  Back in March he passed a blood clot after ejaculation. He went to urgent care, urine culture neg.  Also reports having low testosterone, fatigue, ED, difficulty obtaining and maintaining erections, minimal nocturnal erections, decreased body hair since 2021, generalized weakness.  100-pound weight loss over the past 1.5-2 years Former smoker, quit 2016, 1 pack every 2 days, smoked x 6 years He does not drink enough water Denies STD, is sexually active without condom

## 2024-09-11 NOTE — ASSESSMENT
[FreeTextEntry1] : Weak stream, hesitant stream, straining, sprayed stream, dysuria  Lab: U/A with reflex Will schedule for cysto to r/o stricture Renal/bladder US 8/14/24: unremarkable kidneys, prostate volume 13 cc, PVR 74 ml PT encouraged to drink adequate fluids  Low testosterone, ED, fatigue  Testosterone level 142.0 ng/dL Lab: Testosterone, prolactin PSA 0.16 ng/mL 8/2/24 Encouraged to start exercise up to 3x/week  RTO 2 weeks

## 2024-09-19 ENCOUNTER — APPOINTMENT (OUTPATIENT)
Dept: UROLOGY | Facility: CLINIC | Age: 34
End: 2024-09-19
Payer: MEDICAID

## 2024-09-19 DIAGNOSIS — R79.89 OTHER SPECIFIED ABNORMAL FINDINGS OF BLOOD CHEMISTRY: ICD-10-CM

## 2024-09-19 PROCEDURE — 99213 OFFICE O/P EST LOW 20 MIN: CPT

## 2024-09-19 RX ORDER — TADALAFIL 10 MG/1
10 TABLET, FILM COATED ORAL
Qty: 10 | Refills: 3 | Status: ACTIVE | COMMUNITY
Start: 2024-09-19 | End: 1900-01-01

## 2024-09-19 RX ORDER — CLOMIPHENE CITRATE 50 MG/1
50 TABLET ORAL DAILY
Qty: 15 | Refills: 3 | Status: ACTIVE | COMMUNITY
Start: 2024-09-19 | End: 1900-01-01

## 2024-09-20 ENCOUNTER — NON-APPOINTMENT (OUTPATIENT)
Age: 34
End: 2024-09-20

## 2024-09-23 NOTE — PHYSICAL EXAM
[General Appearance - Well Developed] : well developed [General Appearance - Well Nourished] : well nourished [Normal Appearance] : normal appearance [Well Groomed] : well groomed [General Appearance - In No Acute Distress] : no acute distress [Respiration, Rhythm And Depth] : normal respiratory rhythm and effort [Exaggerated Use Of Accessory Muscles For Inspiration] : no accessory muscle use [Normal Station and Gait] : the gait and station were normal for the patient's age [Skin Color & Pigmentation] : normal skin color and pigmentation [] : no rash [No Focal Deficits] : no focal deficits [Oriented To Time, Place, And Person] : oriented to person, place, and time [Affect] : the affect was normal [Mood] : the mood was normal [Not Anxious] : not anxious [de-identified] : morbidly obese [de-identified] : tethered skin to anterior corona

## 2024-09-23 NOTE — HISTORY OF PRESENT ILLNESS
[FreeTextEntry1] : Pt with LUTS and is s/p Cystoscopy with Dr. Gonzales 9/10/24 and noted to have urethral stricture. he is pending surgery 10/4/24. With c/o low testosterone. He is here today for results of testosterone level.

## 2024-09-23 NOTE — ASSESSMENT
[FreeTextEntry1] : Low testosterone  Testosterone level 167 ng/dL 9/7/24, testosterone 142 ng/dL 8/15/24, testosterone 165 ng/dL 8/2/824 Pt desires fertility. I discussed TRT with off label use of clomiphene. I informed pt that there are no long-term research studies regarding long term effects of clomiphene.  I discussed potential risks of testosterone replacement therapy (TRT) with patient: acne, breast swelling or tenderness, increased red blood cell count, swelling of feet or ankles, increased growth of prostate or pre-existing prostate cancer, decreased testicular size and infertility. Pt informed that follow up visit/blood testing is required 4-6 weeks after start of TRT and at minimum every 3 months for the first year and twice per year thereafter. Pt verbalized an understanding, and all questions answered. Rx: Clomiphene 25 mg QD RTO 4 weeks

## 2024-09-23 NOTE — PHYSICAL EXAM
[General Appearance - Well Developed] : well developed [General Appearance - Well Nourished] : well nourished [Normal Appearance] : normal appearance [Well Groomed] : well groomed [General Appearance - In No Acute Distress] : no acute distress [Respiration, Rhythm And Depth] : normal respiratory rhythm and effort [Exaggerated Use Of Accessory Muscles For Inspiration] : no accessory muscle use [Normal Station and Gait] : the gait and station were normal for the patient's age [Skin Color & Pigmentation] : normal skin color and pigmentation [] : no rash [No Focal Deficits] : no focal deficits [Oriented To Time, Place, And Person] : oriented to person, place, and time [Affect] : the affect was normal [Not Anxious] : not anxious [Mood] : the mood was normal [de-identified] : morbidly obese [de-identified] : tethered skin to anterior corona

## 2024-09-25 ENCOUNTER — OUTPATIENT (OUTPATIENT)
Dept: OUTPATIENT SERVICES | Facility: HOSPITAL | Age: 34
LOS: 1 days | End: 2024-09-25
Payer: COMMERCIAL

## 2024-09-25 VITALS
TEMPERATURE: 98 F | HEIGHT: 70 IN | OXYGEN SATURATION: 97 % | WEIGHT: 315 LBS | SYSTOLIC BLOOD PRESSURE: 110 MMHG | HEART RATE: 75 BPM | DIASTOLIC BLOOD PRESSURE: 78 MMHG | RESPIRATION RATE: 20 BRPM

## 2024-09-25 DIAGNOSIS — I10 ESSENTIAL (PRIMARY) HYPERTENSION: ICD-10-CM

## 2024-09-25 DIAGNOSIS — N35.919 UNSPECIFIED URETHRAL STRICTURE, MALE, UNSPECIFIED SITE: ICD-10-CM

## 2024-09-25 DIAGNOSIS — Z29.9 ENCOUNTER FOR PROPHYLACTIC MEASURES, UNSPECIFIED: ICD-10-CM

## 2024-09-25 DIAGNOSIS — Z98.890 OTHER SPECIFIED POSTPROCEDURAL STATES: Chronic | ICD-10-CM

## 2024-09-25 DIAGNOSIS — Z01.818 ENCOUNTER FOR OTHER PREPROCEDURAL EXAMINATION: ICD-10-CM

## 2024-09-25 DIAGNOSIS — E03.9 HYPOTHYROIDISM, UNSPECIFIED: ICD-10-CM

## 2024-09-25 DIAGNOSIS — Z13.89 ENCOUNTER FOR SCREENING FOR OTHER DISORDER: ICD-10-CM

## 2024-09-25 DIAGNOSIS — E78.5 HYPERLIPIDEMIA, UNSPECIFIED: ICD-10-CM

## 2024-09-25 LAB
A1C WITH ESTIMATED AVERAGE GLUCOSE RESULT: 5.3 % — SIGNIFICANT CHANGE UP (ref 4–5.6)
ANION GAP SERPL CALC-SCNC: 11 MMOL/L — SIGNIFICANT CHANGE UP (ref 5–17)
APPEARANCE UR: CLEAR — SIGNIFICANT CHANGE UP
BACTERIA # UR AUTO: ABNORMAL /HPF
BASOPHILS # BLD AUTO: 0.13 K/UL — SIGNIFICANT CHANGE UP (ref 0–0.2)
BASOPHILS NFR BLD AUTO: 1.2 % — SIGNIFICANT CHANGE UP (ref 0–2)
BILIRUB UR-MCNC: NEGATIVE — SIGNIFICANT CHANGE UP
BLD GP AB SCN SERPL QL: SIGNIFICANT CHANGE UP
BUN SERPL-MCNC: 9.9 MG/DL — SIGNIFICANT CHANGE UP (ref 8–20)
CALCIUM SERPL-MCNC: 9.1 MG/DL — SIGNIFICANT CHANGE UP (ref 8.4–10.5)
CAST: 4 /LPF — SIGNIFICANT CHANGE UP (ref 0–4)
CHLORIDE SERPL-SCNC: 103 MMOL/L — SIGNIFICANT CHANGE UP (ref 96–108)
CO2 SERPL-SCNC: 26 MMOL/L — SIGNIFICANT CHANGE UP (ref 22–29)
COLOR SPEC: SIGNIFICANT CHANGE UP
CREAT SERPL-MCNC: 0.63 MG/DL — SIGNIFICANT CHANGE UP (ref 0.5–1.3)
DIFF PNL FLD: NEGATIVE — SIGNIFICANT CHANGE UP
EGFR: 128 ML/MIN/1.73M2 — SIGNIFICANT CHANGE UP
EOSINOPHIL # BLD AUTO: 0.36 K/UL — SIGNIFICANT CHANGE UP (ref 0–0.5)
EOSINOPHIL NFR BLD AUTO: 3.5 % — SIGNIFICANT CHANGE UP (ref 0–6)
ESTIMATED AVERAGE GLUCOSE: 105 MG/DL — SIGNIFICANT CHANGE UP (ref 68–114)
GLUCOSE SERPL-MCNC: 94 MG/DL — SIGNIFICANT CHANGE UP (ref 70–99)
GLUCOSE UR QL: NEGATIVE MG/DL — SIGNIFICANT CHANGE UP
HCT VFR BLD CALC: 42.3 % — SIGNIFICANT CHANGE UP (ref 39–50)
HGB BLD-MCNC: 14.1 G/DL — SIGNIFICANT CHANGE UP (ref 13–17)
IMM GRANULOCYTES NFR BLD AUTO: 0.5 % — SIGNIFICANT CHANGE UP (ref 0–0.9)
KETONES UR-MCNC: NEGATIVE MG/DL — SIGNIFICANT CHANGE UP
LEUKOCYTE ESTERASE UR-ACNC: ABNORMAL
LYMPHOCYTES # BLD AUTO: 2.97 K/UL — SIGNIFICANT CHANGE UP (ref 1–3.3)
LYMPHOCYTES # BLD AUTO: 28.5 % — SIGNIFICANT CHANGE UP (ref 13–44)
MCHC RBC-ENTMCNC: 31.8 PG — SIGNIFICANT CHANGE UP (ref 27–34)
MCHC RBC-ENTMCNC: 33.3 GM/DL — SIGNIFICANT CHANGE UP (ref 32–36)
MCV RBC AUTO: 95.5 FL — SIGNIFICANT CHANGE UP (ref 80–100)
MONOCYTES # BLD AUTO: 0.87 K/UL — SIGNIFICANT CHANGE UP (ref 0–0.9)
MONOCYTES NFR BLD AUTO: 8.4 % — SIGNIFICANT CHANGE UP (ref 2–14)
NEUTROPHILS # BLD AUTO: 6.03 K/UL — SIGNIFICANT CHANGE UP (ref 1.8–7.4)
NEUTROPHILS NFR BLD AUTO: 57.9 % — SIGNIFICANT CHANGE UP (ref 43–77)
NITRITE UR-MCNC: NEGATIVE — SIGNIFICANT CHANGE UP
PH UR: 6 — SIGNIFICANT CHANGE UP (ref 5–8)
PLATELET # BLD AUTO: 311 K/UL — SIGNIFICANT CHANGE UP (ref 150–400)
POTASSIUM SERPL-MCNC: 4.1 MMOL/L — SIGNIFICANT CHANGE UP (ref 3.5–5.3)
POTASSIUM SERPL-SCNC: 4.1 MMOL/L — SIGNIFICANT CHANGE UP (ref 3.5–5.3)
PROT UR-MCNC: NEGATIVE MG/DL — SIGNIFICANT CHANGE UP
RBC # BLD: 4.43 M/UL — SIGNIFICANT CHANGE UP (ref 4.2–5.8)
RBC # FLD: 11.6 % — SIGNIFICANT CHANGE UP (ref 10.3–14.5)
RBC CASTS # UR COMP ASSIST: 1 /HPF — SIGNIFICANT CHANGE UP (ref 0–4)
SODIUM SERPL-SCNC: 139 MMOL/L — SIGNIFICANT CHANGE UP (ref 135–145)
SP GR SPEC: >1.03 — HIGH (ref 1–1.03)
SQUAMOUS # UR AUTO: 5 /HPF — SIGNIFICANT CHANGE UP (ref 0–5)
UROBILINOGEN FLD QL: 1 MG/DL — SIGNIFICANT CHANGE UP (ref 0.2–1)
WBC # BLD: 10.41 K/UL — SIGNIFICANT CHANGE UP (ref 3.8–10.5)
WBC # FLD AUTO: 10.41 K/UL — SIGNIFICANT CHANGE UP (ref 3.8–10.5)
WBC UR QL: 18 /HPF — HIGH (ref 0–5)

## 2024-09-25 PROCEDURE — 85025 COMPLETE CBC W/AUTO DIFF WBC: CPT

## 2024-09-25 PROCEDURE — 80048 BASIC METABOLIC PNL TOTAL CA: CPT

## 2024-09-25 PROCEDURE — 86850 RBC ANTIBODY SCREEN: CPT

## 2024-09-25 PROCEDURE — 81001 URINALYSIS AUTO W/SCOPE: CPT

## 2024-09-25 PROCEDURE — 87077 CULTURE AEROBIC IDENTIFY: CPT

## 2024-09-25 PROCEDURE — 86901 BLOOD TYPING SEROLOGIC RH(D): CPT

## 2024-09-25 PROCEDURE — G0463: CPT

## 2024-09-25 PROCEDURE — 86900 BLOOD TYPING SEROLOGIC ABO: CPT

## 2024-09-25 PROCEDURE — 36415 COLL VENOUS BLD VENIPUNCTURE: CPT

## 2024-09-25 PROCEDURE — 87086 URINE CULTURE/COLONY COUNT: CPT

## 2024-09-25 PROCEDURE — 83036 HEMOGLOBIN GLYCOSYLATED A1C: CPT

## 2024-09-25 RX ORDER — SODIUM CHLORIDE 0.9 % (FLUSH) 0.9 %
3 SYRINGE (ML) INJECTION ONCE
Refills: 0 | Status: DISCONTINUED | OUTPATIENT
Start: 2024-10-04 | End: 2024-10-04

## 2024-09-25 NOTE — H&P PST ADULT - NSICDXPASTSURGICALHX_GEN_ALL_CORE_FT
PAST SURGICAL HISTORY:  H/O cystoscopy     H/O myringotomy      PAST SURGICAL HISTORY:  H/O myringotomy

## 2024-09-25 NOTE — H&P PST ADULT - HISTORY OF PRESENT ILLNESS
32 yo M PMH of HTN, HLD, hypothyroid, lower urinary tract symptoms s/p Cystoscopy with Dr Gonzales on  9/10/2024 and was noted to have urethral stricture, presents for preop assessment prior to cystoscopy, direct vision internal urethrotomy, possible urethral dilation w/Dr Gonzales on 10/4/2024, pending medical clearance 34 yo M PMH of HTN, HLD, asthma, hypothyroid, obesity, presents with c/o unsteady urinary stream, pelvic pressure, s/p cystoscopy with Dr Gonzales on  9/10/2024 and was noted to have urethral stricture, presents for preop assessment prior to cystoscopy, direct vision internal urethrotomy, possible urethral dilation w/Dr Gonzales on 10/4/2024, pending medical clearance 34 yo M PMH of HTN, HLD, asthma, hypothyroid, obesity (BMI 47.3), presents with c/o unsteady urinary stream, pelvic pressure, s/p cystoscopy with Dr Gonzales on  9/10/2024 and was noted to have urethral stricture. Denies fevers, chills, flank pain, dysuria, hematuria, nausea, vomiting. Scheduled for cystoscopy, direct vision internal urethrotomy, possible urethral dilation w/Dr Gonzales on 10/4/2024, pending medical clearance

## 2024-09-25 NOTE — H&P PST ADULT - PROBLEM SELECTOR PLAN 2
preop assessment, scheduled for cystoscopy, direct vision internal urethrotomy, possible urethral dilation w/Dr Gonzales on 10/4/2024, pending medical clearance

## 2024-09-25 NOTE — H&P PST ADULT - PSYCHIATRIC
normal affect/alert and oriented x3/normal behavior normal affect/alert and oriented x3/normal behavior/anxious details…

## 2024-09-25 NOTE — H&P PST ADULT - PROBLEM SELECTOR PLAN 4
caprini score 3, moderate risk for dvt, SCD ordered, surgical team to assess for dvt prophylaxis caprini score 6, moderate risk for dvt, SCD ordered, surgical team to assess for dvt prophylaxis

## 2024-09-25 NOTE — H&P PST ADULT - NEGATIVE RESPIRATORY AND THORAX SYMPTOMS
[id number] Sherrell Ramirez did they try taking 2 tabs at once?
Left detailed message.
Ok new med called in
Patients wife calling, she states that patient has been taking new sleeping pills you gave him for a week now and they are not helping.  He is not falling asleep until 5am
Spoke with kamla, he said he takes 2 and all he does is lay in the bed
no wheezing/no dyspnea/no cough

## 2024-09-25 NOTE — H&P PST ADULT - NSICDXFAMILYHX_GEN_ALL_CORE_FT
FAMILY HISTORY:  Grandparent  Still living? Unknown  FH: pulmonary embolism, Age at diagnosis: Age Unknown

## 2024-09-25 NOTE — H&P PST ADULT - PROBLEM SELECTOR PLAN 1
preop assessment, 12 lead EKG ordered and reviewed, medical clearance pending preop assessment, 12 lead EKG reviewed, medical clearance pending

## 2024-09-25 NOTE — H&P PST ADULT - NSICDXPASTMEDICALHX_GEN_ALL_CORE_FT
PAST MEDICAL HISTORY:  HLD (hyperlipidemia)     HTN (hypertension)     Hypothyroid     Urethral stricture      PAST MEDICAL HISTORY:  At risk for sleep apnea     HLD (hyperlipidemia)     HTN (hypertension)     Hypothyroid     Urethral stricture     Vertigo      PAST MEDICAL HISTORY:  Asthma     At risk for sleep apnea     HLD (hyperlipidemia)     HTN (hypertension)     Hypothyroid     Obese     Urethral stricture     Vertigo

## 2024-09-27 ENCOUNTER — APPOINTMENT (OUTPATIENT)
Dept: SURGERY | Facility: CLINIC | Age: 34
End: 2024-09-27
Payer: MEDICAID

## 2024-09-27 VITALS
OXYGEN SATURATION: 96 % | WEIGHT: 315 LBS | HEIGHT: 70 IN | DIASTOLIC BLOOD PRESSURE: 90 MMHG | HEART RATE: 78 BPM | BODY MASS INDEX: 45.1 KG/M2 | SYSTOLIC BLOOD PRESSURE: 142 MMHG

## 2024-09-27 DIAGNOSIS — E66.01 MORBID (SEVERE) OBESITY DUE TO EXCESS CALORIES: ICD-10-CM

## 2024-09-27 DIAGNOSIS — K42.9 UMBILICAL HERNIA W/OUT OBSTRUCTION OR GANGRENE: ICD-10-CM

## 2024-09-27 PROBLEM — E03.9 HYPOTHYROIDISM, UNSPECIFIED: Chronic | Status: ACTIVE | Noted: 2024-09-25

## 2024-09-27 PROBLEM — J45.909 UNSPECIFIED ASTHMA, UNCOMPLICATED: Chronic | Status: ACTIVE | Noted: 2024-09-25

## 2024-09-27 PROBLEM — I10 ESSENTIAL (PRIMARY) HYPERTENSION: Chronic | Status: ACTIVE | Noted: 2024-09-25

## 2024-09-27 PROBLEM — Z91.89 OTHER SPECIFIED PERSONAL RISK FACTORS, NOT ELSEWHERE CLASSIFIED: Chronic | Status: ACTIVE | Noted: 2024-09-25

## 2024-09-27 PROBLEM — E66.9 OBESITY, UNSPECIFIED: Chronic | Status: ACTIVE | Noted: 2024-09-25

## 2024-09-27 PROBLEM — R42 DIZZINESS AND GIDDINESS: Chronic | Status: ACTIVE | Noted: 2024-09-25

## 2024-09-27 PROBLEM — N35.919 UNSPECIFIED URETHRAL STRICTURE, MALE, UNSPECIFIED SITE: Chronic | Status: ACTIVE | Noted: 2024-09-25

## 2024-09-27 PROBLEM — E78.5 HYPERLIPIDEMIA, UNSPECIFIED: Chronic | Status: ACTIVE | Noted: 2024-09-25

## 2024-09-27 PROBLEM — Z01.818 PREOP EXAMINATION: Status: ACTIVE | Noted: 2024-09-27

## 2024-09-27 LAB
CULTURE RESULTS: ABNORMAL
SPECIMEN SOURCE: SIGNIFICANT CHANGE UP

## 2024-09-27 PROCEDURE — 99205 OFFICE O/P NEW HI 60 MIN: CPT

## 2024-09-27 NOTE — PHYSICAL EXAM
[No Rash or Lesion] : No rash or lesion [Alert] : alert [Oriented to Person] : oriented to person [Oriented to Place] : oriented to place [Oriented to Time] : oriented to time [Calm] : calm [JVD] : no jugular venous distention  [de-identified] : No acute distress [de-identified] : No respiratory distress [de-identified] : Regular rate [de-identified] : soft, nontender. no rebound or guarding. palpable umbilical hernia - not reducible, nontender. unable to palpate defect edges.  [de-identified] : normal range of motion

## 2024-09-27 NOTE — ASSESSMENT
[FreeTextEntry1] : Mr. NICK is a 33 year old man with umbilical hernia. I recommend CT of abdomen/pelvis to evaluate hernia anatomy and anatomy of abdominal wall for operative planning.

## 2024-09-27 NOTE — CONSULT LETTER
[Dear  ___] : Dear ~DONNA, [Consult Letter:] : I had the pleasure of evaluating your patient, [unfilled]. [Please see my note below.] : Please see my note below. [Consult Closing:] : Thank you very much for allowing me to participate in the care of this patient.  If you have any questions, please do not hesitate to contact me. [Sincerely,] : Sincerely, [FreeTextEntry3] : Brody Rosas MD, FACS Director of Bariatric Surgery [facility]  Assistant Professor of Surgery  [employer] at [facility]

## 2024-09-27 NOTE — HISTORY OF PRESENT ILLNESS
[de-identified] : Mr. NICK is a 33 year old man with umbilical hernia, appreciated after recent significant weight loss, referred by Osman Trujillo for evaluation. Reports occasional pain, especially at end of day and with activity. Denies fever/chills/nausea/emesis or changes in bowel or bladder habits. Tolerating diet. Normal bowel movements.   vapes nicotine  No recent cross sectional imaging available

## 2024-10-02 ENCOUNTER — APPOINTMENT (OUTPATIENT)
Dept: FAMILY MEDICINE | Facility: CLINIC | Age: 34
End: 2024-10-02
Payer: MEDICAID

## 2024-10-02 VITALS
DIASTOLIC BLOOD PRESSURE: 78 MMHG | HEIGHT: 70 IN | OXYGEN SATURATION: 96 % | BODY MASS INDEX: 45.1 KG/M2 | SYSTOLIC BLOOD PRESSURE: 122 MMHG | HEART RATE: 85 BPM | TEMPERATURE: 98.2 F | WEIGHT: 315 LBS

## 2024-10-02 DIAGNOSIS — N35.919 UNSPECIFIED URETHRAL STRICTURE, MALE, UNSPECIFIED SITE: ICD-10-CM

## 2024-10-02 DIAGNOSIS — N39.0 URINARY TRACT INFECTION, SITE NOT SPECIFIED: ICD-10-CM

## 2024-10-02 DIAGNOSIS — Z01.818 ENCOUNTER FOR OTHER PREPROCEDURAL EXAMINATION: ICD-10-CM

## 2024-10-02 PROCEDURE — 99204 OFFICE O/P NEW MOD 45 MIN: CPT

## 2024-10-02 RX ORDER — CLINDAMYCIN HYDROCHLORIDE 150 MG/1
150 CAPSULE ORAL EVERY 6 HOURS
Qty: 20 | Refills: 0 | Status: ACTIVE | COMMUNITY
Start: 2024-10-02 | End: 1900-01-01

## 2024-10-02 NOTE — ASSESSMENT
Pt said she will  rx from primary care pharmacy    [As per surgery] : as per surgery [FreeTextEntry4] : Medically optimized for proposed procedure

## 2024-10-02 NOTE — PHYSICAL EXAM
[No Acute Distress] : no acute distress [Well Nourished] : well nourished [Well Developed] : well developed [Well-Appearing] : well-appearing [PERRL] : pupils equal round and reactive to light [EOMI] : extraocular movements intact [Normal Outer Ear/Nose] : the outer ears and nose were normal in appearance [Normal Oropharynx] : the oropharynx was normal [No JVD] : no jugular venous distention [No Respiratory Distress] : no respiratory distress  [No Accessory Muscle Use] : no accessory muscle use [Clear to Auscultation] : lungs were clear to auscultation bilaterally [Normal Rate] : normal rate  [Regular Rhythm] : with a regular rhythm [Normal S1, S2] : normal S1 and S2 [No Carotid Bruits] : no carotid bruits [No Edema] : there was no peripheral edema [No Palpable Aorta] : no palpable aorta [No Extremity Clubbing/Cyanosis] : no extremity clubbing/cyanosis [No Axillary Lymphadenopathy] : no axillary lymphadenopathy [Soft] : abdomen soft [Non Tender] : non-tender [No Masses] : no abdominal mass palpated [No HSM] : no HSM [Normal Bowel Sounds] : normal bowel sounds [No CVA Tenderness] : no CVA  tenderness [Grossly Normal Strength/Tone] : grossly normal strength/tone [No Rash] : no rash [Coordination Grossly Intact] : coordination grossly intact [No Focal Deficits] : no focal deficits [Normal Gait] : normal gait [Normal Affect] : the affect was normal [Normal Mood] : the mood was normal [Normal Insight/Judgement] : insight and judgment were intact [de-identified] : minimally tender umbilical hernia

## 2024-10-02 NOTE — HISTORY OF PRESENT ILLNESS
[No Pertinent Cardiac History] : no history of aortic stenosis, atrial fibrillation, coronary artery disease, recent myocardial infarction, or implantable device/pacemaker [Asthma] : asthma [Smoker] : smoker [No Adverse Anesthesia Reaction] : no adverse anesthesia reaction in self or family member [Aortic Stenosis] : no aortic stenosis [Atrial Fibrillation] : no atrial fibrillation [Coronary Artery Disease] : no coronary artery disease [Recent Myocardial Infarction] : no recent myocardial infarction [Implantable Device/Pacemaker] : no implantable device/pacemaker [COPD] : no COPD [Sleep Apnea] : no sleep apnea [Chronic Anticoagulation] : no chronic anticoagulation [Chronic Kidney Disease] : no chronic kidney disease [Diabetes] : no diabetes [FreeTextEntry1] : Removing scar tissue [FreeTextEntry2] : 10/04/2024 [FreeTextEntry3] : Dr. Gonzales [FreeTextEntry4] : 32 yo male for medical clearance.  Scheduled for Urologic procedure on 10/4/24 2n2 urethral stricture c Dr. Gonzales.        no f/c/s   no CP/SOB c activity no dizziness palpitations  no N/V/D +ve BM qd NL no bloody/black stools  +ve decreased strength of urinary stream  intermittent urinary retention     NO Hx of adverse reaction to anesthesia  NO allergy to shellfish/latex/iodine  NO hx of Blood Transfusion  ABO Blood Type= A positive  NO dentures    [FreeTextEntry5] : Asthma controlled with aid of Ventolin HFA prn   +ve use of Inhaled Nicotine Device    Denies cigarette smoking

## 2024-10-03 NOTE — ASU PATIENT PROFILE, ADULT - NSICDXPASTMEDICALHX_GEN_ALL_CORE_FT
PAST MEDICAL HISTORY:  Asthma     At risk for sleep apnea     HLD (hyperlipidemia)     HTN (hypertension)     Hypothyroid     Obese     Urethral stricture     Vertigo

## 2024-10-04 ENCOUNTER — OUTPATIENT (OUTPATIENT)
Dept: INPATIENT UNIT | Facility: HOSPITAL | Age: 34
LOS: 1 days | End: 2024-10-04
Payer: COMMERCIAL

## 2024-10-04 ENCOUNTER — TRANSCRIPTION ENCOUNTER (OUTPATIENT)
Age: 34
End: 2024-10-04

## 2024-10-04 ENCOUNTER — APPOINTMENT (OUTPATIENT)
Dept: UROLOGY | Facility: HOSPITAL | Age: 34
End: 2024-10-04

## 2024-10-04 VITALS
OXYGEN SATURATION: 96 % | HEART RATE: 71 BPM | DIASTOLIC BLOOD PRESSURE: 70 MMHG | RESPIRATION RATE: 14 BRPM | SYSTOLIC BLOOD PRESSURE: 137 MMHG | TEMPERATURE: 97 F

## 2024-10-04 VITALS
TEMPERATURE: 99 F | OXYGEN SATURATION: 96 % | DIASTOLIC BLOOD PRESSURE: 84 MMHG | HEART RATE: 84 BPM | HEIGHT: 70 IN | RESPIRATION RATE: 16 BRPM | SYSTOLIC BLOOD PRESSURE: 153 MMHG | WEIGHT: 315 LBS

## 2024-10-04 DIAGNOSIS — Z98.890 OTHER SPECIFIED POSTPROCEDURAL STATES: Chronic | ICD-10-CM

## 2024-10-04 DIAGNOSIS — N35.919 UNSPECIFIED URETHRAL STRICTURE, MALE, UNSPECIFIED SITE: ICD-10-CM

## 2024-10-04 PROCEDURE — C1769: CPT

## 2024-10-04 PROCEDURE — 52276 CYSTOSCOPY AND TREATMENT: CPT

## 2024-10-04 PROCEDURE — C9399: CPT

## 2024-10-04 DEVICE — GUIDEWIRE SENSOR DUAL-FLEX NITINOL STRAIGHT .035" X 150CM: Type: IMPLANTABLE DEVICE | Status: FUNCTIONAL

## 2024-10-04 RX ORDER — HYDROMORPHONE HYDROCHLORIDE 1 MG/ML
0.5 INJECTION, SOLUTION INTRAMUSCULAR; INTRAVENOUS; SUBCUTANEOUS
Refills: 0 | Status: DISCONTINUED | OUTPATIENT
Start: 2024-10-04 | End: 2024-10-04

## 2024-10-04 RX ORDER — MINOXIDIL/FINASTERIDE 7 %-0.1 %
0.25 SOLUTION, NON-ORAL TOPICAL
Refills: 0 | DISCHARGE

## 2024-10-04 RX ORDER — MINOXIDIL 2 %
1 SOLUTION, NON-ORAL TOPICAL
Refills: 0 | DISCHARGE

## 2024-10-04 RX ORDER — GENTAMICIN 10 MG/ML
80 INJECTION, SOLUTION INTRAMUSCULAR; INTRAVENOUS ONCE
Refills: 0 | Status: DISCONTINUED | OUTPATIENT
Start: 2024-10-04 | End: 2024-10-04

## 2024-10-04 RX ORDER — ACETAMINOPHEN 325 MG
975 TABLET ORAL ONCE
Refills: 0 | Status: COMPLETED | OUTPATIENT
Start: 2024-10-04 | End: 2024-10-04

## 2024-10-04 RX ORDER — ONDANSETRON HCL/PF 4 MG/2 ML
4 VIAL (ML) INJECTION ONCE
Refills: 0 | Status: DISCONTINUED | OUTPATIENT
Start: 2024-10-04 | End: 2024-10-04

## 2024-10-04 RX ORDER — ACETAMINOPHEN 325 MG
1000 TABLET ORAL ONCE
Refills: 0 | Status: COMPLETED | OUTPATIENT
Start: 2024-10-04 | End: 2024-10-04

## 2024-10-04 RX ORDER — OMEGA-3-ACID ETHYL ESTERS 1 G/1
0 CAPSULE, LIQUID FILLED ORAL
Refills: 0 | DISCHARGE

## 2024-10-04 RX ORDER — SODIUM CHLORIDE IRRIG SOLUTION 0.9 %
1000 SOLUTION, IRRIGATION IRRIGATION
Refills: 0 | Status: DISCONTINUED | OUTPATIENT
Start: 2024-10-04 | End: 2024-10-04

## 2024-10-04 RX ORDER — FENTANYL CITRATE-0.9 % NACL/PF 300MCG/30
25 PATIENT CONTROLLED ANALGESIA VIAL INJECTION
Refills: 0 | Status: DISCONTINUED | OUTPATIENT
Start: 2024-10-04 | End: 2024-10-04

## 2024-10-04 RX ORDER — CLINDAMYCIN PHOSPHATE 150 MG/ML
1 INJECTION, SOLUTION INTRAVENOUS
Refills: 0 | DISCHARGE

## 2024-10-04 RX ORDER — ALBUTEROL 90 MCG
2 AEROSOL (GRAM) INHALATION
Refills: 0 | DISCHARGE

## 2024-10-04 RX ADMIN — Medication 400 MILLIGRAM(S): at 17:03

## 2024-10-04 RX ADMIN — HYDROMORPHONE HYDROCHLORIDE 0.5 MILLIGRAM(S): 1 INJECTION, SOLUTION INTRAMUSCULAR; INTRAVENOUS; SUBCUTANEOUS at 17:00

## 2024-10-04 RX ADMIN — Medication 1000 MILLIGRAM(S): at 17:14

## 2024-10-04 RX ADMIN — Medication 975 MILLIGRAM(S): at 12:55

## 2024-10-04 RX ADMIN — Medication 100 MILLILITER(S): at 17:15

## 2024-10-04 RX ADMIN — Medication 200 MILLIGRAM(S): at 15:44

## 2024-10-04 RX ADMIN — HYDROMORPHONE HYDROCHLORIDE 0.5 MILLIGRAM(S): 1 INJECTION, SOLUTION INTRAMUSCULAR; INTRAVENOUS; SUBCUTANEOUS at 17:45

## 2024-10-04 RX ADMIN — HYDROMORPHONE HYDROCHLORIDE 0.5 MILLIGRAM(S): 1 INJECTION, SOLUTION INTRAMUSCULAR; INTRAVENOUS; SUBCUTANEOUS at 17:14

## 2024-10-04 RX ADMIN — HYDROMORPHONE HYDROCHLORIDE 0.5 MILLIGRAM(S): 1 INJECTION, SOLUTION INTRAMUSCULAR; INTRAVENOUS; SUBCUTANEOUS at 16:43

## 2024-10-04 NOTE — ASU DISCHARGE PLAN (ADULT/PEDIATRIC) - ASU DC SPECIAL INSTRUCTIONSFT
Please take antibiotics as directed. Take Tylenol every 6 hours as needed for pain, you may experience mild bleeding in urine which is to be expected. Please call the office if you experience worsened bloody urine that becomes darker than fruit punch colored or with copious clots or if your childs stops draining. Please call the office on Monday to schedule an appointment in 5 days to have a trial of void in the office. Please take antibiotics as directed. Take Tylenol every 6 hours as needed for pain, you may experience mild bleeding in urine which is to be expected. Please call the office if you experience worsened bloody urine that becomes darker than fruit punch colored or with copious clots or if your childs stops draining. Please document daily childs output quantity and color. Please call the office on Monday to schedule an appointment in 5 days to have a trial of void in the office.

## 2024-10-04 NOTE — ASU DISCHARGE PLAN (ADULT/PEDIATRIC) - CARE PROVIDER_API CALL
Reinier Gonzales [place]  Urology  [address] [place] [id number]  Phone: [phone]  Fax: [phone]  Follow Up Time: 1 week

## 2024-10-04 NOTE — ASU DISCHARGE PLAN (ADULT/PEDIATRIC) - NS MD DC FALL RISK RISK
For information on Fall & Injury Prevention, visit: [email or web address] OR  [email or web address] OR  https://www.cdc.gov/steadi/patient.html

## 2024-10-04 NOTE — BRIEF OPERATIVE NOTE - NSICDXBRIEFPROCEDURE_GEN_ALL_CORE_FT
PROCEDURES:  Cystourethroscopy, with DVIU, using cold knife 04-Oct-2024 16:29:45  Reinier Gonzales

## 2024-10-10 ENCOUNTER — APPOINTMENT (OUTPATIENT)
Dept: UROLOGY | Facility: CLINIC | Age: 34
End: 2024-10-10

## 2024-10-10 PROCEDURE — A4216: CPT | Mod: NC

## 2024-10-10 PROCEDURE — 51700 IRRIGATION OF BLADDER: CPT

## 2024-10-14 ENCOUNTER — APPOINTMENT (OUTPATIENT)
Dept: SURGERY | Facility: CLINIC | Age: 34
End: 2024-10-14

## 2024-10-14 ENCOUNTER — APPOINTMENT (OUTPATIENT)
Dept: FAMILY MEDICINE | Facility: CLINIC | Age: 34
End: 2024-10-14
Payer: MEDICAID

## 2024-10-14 VITALS
DIASTOLIC BLOOD PRESSURE: 74 MMHG | HEART RATE: 75 BPM | BODY MASS INDEX: 45.1 KG/M2 | WEIGHT: 315 LBS | HEIGHT: 70 IN | OXYGEN SATURATION: 95 % | SYSTOLIC BLOOD PRESSURE: 120 MMHG

## 2024-10-14 DIAGNOSIS — E53.8 DEFICIENCY OF OTHER SPECIFIED B GROUP VITAMINS: ICD-10-CM

## 2024-10-14 PROCEDURE — 99213 OFFICE O/P EST LOW 20 MIN: CPT | Mod: 25

## 2024-10-15 LAB — VIT B12 SERPL-MCNC: 396 PG/ML

## 2024-10-17 ENCOUNTER — APPOINTMENT (OUTPATIENT)
Dept: SURGERY | Facility: CLINIC | Age: 34
End: 2024-10-17

## 2024-11-04 ENCOUNTER — RX RENEWAL (OUTPATIENT)
Age: 34
End: 2024-11-04

## 2024-11-13 ENCOUNTER — RX RENEWAL (OUTPATIENT)
Age: 34
End: 2024-11-13

## 2024-11-13 RX ORDER — ALBUTEROL SULFATE 90 UG/1
108 (90 BASE) INHALANT RESPIRATORY (INHALATION)
Qty: 1 | Refills: 0 | Status: ACTIVE | COMMUNITY
Start: 2024-11-13 | End: 1900-01-01

## 2024-12-05 ENCOUNTER — APPOINTMENT (OUTPATIENT)
Dept: UROLOGY | Facility: CLINIC | Age: 34
End: 2024-12-05

## 2024-12-23 ENCOUNTER — APPOINTMENT (OUTPATIENT)
Dept: FAMILY MEDICINE | Facility: CLINIC | Age: 34
End: 2024-12-23

## 2025-02-12 ENCOUNTER — RX RENEWAL (OUTPATIENT)
Age: 35
End: 2025-02-12

## 2025-08-04 ENCOUNTER — APPOINTMENT (OUTPATIENT)
Dept: FAMILY MEDICINE | Facility: CLINIC | Age: 35
End: 2025-08-04

## 2025-09-11 ENCOUNTER — APPOINTMENT (OUTPATIENT)
Dept: FAMILY MEDICINE | Facility: CLINIC | Age: 35
End: 2025-09-11

## 2025-09-11 VITALS
HEIGHT: 70 IN | SYSTOLIC BLOOD PRESSURE: 130 MMHG | HEART RATE: 80 BPM | WEIGHT: 315 LBS | OXYGEN SATURATION: 95 % | DIASTOLIC BLOOD PRESSURE: 80 MMHG | BODY MASS INDEX: 45.1 KG/M2

## 2025-09-11 DIAGNOSIS — E03.9 HYPOTHYROIDISM, UNSPECIFIED: ICD-10-CM

## 2025-09-11 DIAGNOSIS — R53.83 OTHER FATIGUE: ICD-10-CM

## 2025-09-11 DIAGNOSIS — R79.89 OTHER SPECIFIED ABNORMAL FINDINGS OF BLOOD CHEMISTRY: ICD-10-CM

## 2025-09-11 DIAGNOSIS — R73.9 HYPERGLYCEMIA, UNSPECIFIED: ICD-10-CM

## 2025-09-11 DIAGNOSIS — J45.909 UNSPECIFIED ASTHMA, UNCOMPLICATED: ICD-10-CM

## 2025-09-11 DIAGNOSIS — G47.00 INSOMNIA, UNSPECIFIED: ICD-10-CM

## 2025-09-11 DIAGNOSIS — E66.01 MORBID (SEVERE) OBESITY DUE TO EXCESS CALORIES: ICD-10-CM

## 2025-09-11 DIAGNOSIS — E78.5 HYPERLIPIDEMIA, UNSPECIFIED: ICD-10-CM

## 2025-09-11 DIAGNOSIS — L64.9 ANDROGENIC ALOPECIA, UNSPECIFIED: ICD-10-CM

## 2025-09-11 DIAGNOSIS — E53.8 DEFICIENCY OF OTHER SPECIFIED B GROUP VITAMINS: ICD-10-CM

## 2025-09-11 PROCEDURE — 99215 OFFICE O/P EST HI 40 MIN: CPT | Mod: 25

## 2025-09-11 RX ORDER — FINASTERIDE 1 MG/1
1 TABLET ORAL DAILY
Qty: 90 | Refills: 0 | Status: ACTIVE | COMMUNITY
Start: 2025-09-11 | End: 1900-01-01

## 2025-09-12 LAB
ALBUMIN SERPL ELPH-MCNC: 4.7 G/DL
ALP BLD-CCNC: 39 U/L
ALT SERPL-CCNC: 36 U/L
ANION GAP SERPL CALC-SCNC: 13 MMOL/L
APPEARANCE: CLEAR
AST SERPL-CCNC: 26 U/L
BACTERIA: NEGATIVE /HPF
BASOPHILS # BLD AUTO: 0.11 K/UL
BASOPHILS NFR BLD AUTO: 1.2 %
BILIRUB SERPL-MCNC: 0.4 MG/DL
BILIRUBIN URINE: NEGATIVE
BLOOD URINE: NEGATIVE
BUN SERPL-MCNC: 11 MG/DL
CALCIUM SERPL-MCNC: 9.6 MG/DL
CAST: 0 /LPF
CHLORIDE SERPL-SCNC: 105 MMOL/L
CHOLEST SERPL-MCNC: 180 MG/DL
CK SERPL-CCNC: 244 U/L
CO2 SERPL-SCNC: 23 MMOL/L
COLOR: YELLOW
CORTIS SERPL-MCNC: 17 UG/DL
CREAT SERPL-MCNC: 0.64 MG/DL
EGFRCR SERPLBLD CKD-EPI 2021: 127 ML/MIN/1.73M2
EOSINOPHIL # BLD AUTO: 0.35 K/UL
EOSINOPHIL NFR BLD AUTO: 3.9 %
EPITHELIAL CELLS: 5 /HPF
ESTIMATED AVERAGE GLUCOSE: 128 MG/DL
GGT SERPL-CCNC: 20 U/L
GLUCOSE QUALITATIVE U: NEGATIVE MG/DL
GLUCOSE SERPL-MCNC: 116 MG/DL
HBA1C MFR BLD HPLC: 6.1 %
HCT VFR BLD CALC: 48.5 %
HDLC SERPL-MCNC: 37 MG/DL
HGB BLD-MCNC: 15.6 G/DL
IMM GRANULOCYTES NFR BLD AUTO: 0.3 %
KETONES URINE: NEGATIVE MG/DL
LDLC SERPL-MCNC: 126 MG/DL
LEUKOCYTE ESTERASE URINE: ABNORMAL
LYMPHOCYTES # BLD AUTO: 3.05 K/UL
LYMPHOCYTES NFR BLD AUTO: 33.9 %
MAN DIFF?: NORMAL
MCHC RBC-ENTMCNC: 32.2 G/DL
MCHC RBC-ENTMCNC: 32.2 PG
MCV RBC AUTO: 100 FL
MICROSCOPIC-UA: NORMAL
MONOCYTES # BLD AUTO: 0.76 K/UL
MONOCYTES NFR BLD AUTO: 8.4 %
NEUTROPHILS # BLD AUTO: 4.7 K/UL
NEUTROPHILS NFR BLD AUTO: 52.3 %
NITRITE URINE: NEGATIVE
NONHDLC SERPL-MCNC: 144 MG/DL
PH URINE: 7
PLATELET # BLD AUTO: 312 K/UL
POTASSIUM SERPL-SCNC: 4.9 MMOL/L
PROT SERPL-MCNC: 6.9 G/DL
PROTEIN URINE: NEGATIVE MG/DL
PSA FREE FLD-MCNC: 34 %
PSA FREE SERPL-MCNC: 0.06 NG/ML
PSA SERPL-MCNC: 0.18 NG/ML
RBC # BLD: 4.85 M/UL
RBC # FLD: 12 %
RED BLOOD CELLS URINE: 1 /HPF
SODIUM SERPL-SCNC: 141 MMOL/L
SPECIFIC GRAVITY URINE: 1.02
T3FREE SERPL-MCNC: 3.52 PG/ML
T4 FREE SERPL-MCNC: 1 NG/DL
TRIGL SERPL-MCNC: 98 MG/DL
TSH SERPL-ACNC: 3.43 UIU/ML
URATE SERPL-MCNC: 6.5 MG/DL
UROBILINOGEN URINE: 0.2 MG/DL
WBC # FLD AUTO: 9 K/UL
WHITE BLOOD CELLS URINE: 0 /HPF

## 2025-09-15 LAB
TESTOST FREE SERPL-MCNC: 7.1 PG/ML
TESTOST SERPL-MCNC: 139 NG/DL

## (undated) DEVICE — WARMING BLANKET UPPER ADULT

## (undated) DEVICE — TUBING IRR SET FOR CYSTOSCOPY 77"

## (undated) DEVICE — TUBING TUR 2 PRONG

## (undated) DEVICE — VENODYNE/SCD SLEEVE CALF MEDIUM

## (undated) DEVICE — Device

## (undated) DEVICE — SOL IRR BAG NS 0.9% 3000ML

## (undated) DEVICE — SYR LUER LOK 10CC

## (undated) DEVICE — GLV 8 PROTEXIS (CREAM) NEU-THERA

## (undated) DEVICE — PACK CYSTOSCOPY TIBURON

## (undated) DEVICE — KNIFE COLD STRAIGHT SINGLE USE

## (undated) DEVICE — DRAPE C ARM UNIVERSAL

## (undated) DEVICE — GOWN XXL

## (undated) DEVICE — FOLEY CATH 2-WAY 18FR 5CC LATEX COUNCIL RED